# Patient Record
Sex: FEMALE | Race: WHITE | NOT HISPANIC OR LATINO | Employment: FULL TIME | URBAN - METROPOLITAN AREA
[De-identification: names, ages, dates, MRNs, and addresses within clinical notes are randomized per-mention and may not be internally consistent; named-entity substitution may affect disease eponyms.]

---

## 2022-08-11 DIAGNOSIS — O09.522 MULTIGRAVIDA OF ADVANCED MATERNAL AGE IN SECOND TRIMESTER: Primary | ICD-10-CM

## 2022-08-22 ENCOUNTER — OFFICE VISIT (OUTPATIENT)
Dept: MATERNAL FETAL MEDICINE | Facility: CLINIC | Age: 37
End: 2022-08-22
Payer: MEDICAID

## 2022-08-22 VITALS
HEIGHT: 64 IN | WEIGHT: 182 LBS | HEART RATE: 88 BPM | SYSTOLIC BLOOD PRESSURE: 124 MMHG | RESPIRATION RATE: 20 BRPM | DIASTOLIC BLOOD PRESSURE: 62 MMHG | OXYGEN SATURATION: 98 % | BODY MASS INDEX: 31.07 KG/M2

## 2022-08-22 DIAGNOSIS — O09.522 AMA (ADVANCED MATERNAL AGE) MULTIGRAVIDA 35+, SECOND TRIMESTER: Chronic | ICD-10-CM

## 2022-08-22 DIAGNOSIS — O09.522 MULTIGRAVIDA OF ADVANCED MATERNAL AGE IN SECOND TRIMESTER: ICD-10-CM

## 2022-08-22 PROCEDURE — 76811 OB US DETAILED SNGL FETUS: CPT | Mod: 26,95,S$GLB, | Performed by: OBSTETRICS & GYNECOLOGY

## 2022-08-22 PROCEDURE — 76811 PR US, OB FETAL EVAL & EXAM, TRANSABDOM,FIRST GESTATION: ICD-10-PCS | Mod: 26,95,S$GLB, | Performed by: OBSTETRICS & GYNECOLOGY

## 2022-08-22 PROCEDURE — 1159F PR MEDICATION LIST DOCUMENTED IN MEDICAL RECORD: ICD-10-PCS | Mod: CPTII,S$GLB,, | Performed by: OBSTETRICS & GYNECOLOGY

## 2022-08-22 PROCEDURE — 3074F PR MOST RECENT SYSTOLIC BLOOD PRESSURE < 130 MM HG: ICD-10-PCS | Mod: CPTII,S$GLB,, | Performed by: OBSTETRICS & GYNECOLOGY

## 2022-08-22 PROCEDURE — 3008F PR BODY MASS INDEX (BMI) DOCUMENTED: ICD-10-PCS | Mod: CPTII,S$GLB,, | Performed by: OBSTETRICS & GYNECOLOGY

## 2022-08-22 PROCEDURE — 99203 OFFICE O/P NEW LOW 30 MIN: CPT | Mod: 25,TH,95,S$GLB | Performed by: OBSTETRICS & GYNECOLOGY

## 2022-08-22 PROCEDURE — 1159F MED LIST DOCD IN RCRD: CPT | Mod: CPTII,S$GLB,, | Performed by: OBSTETRICS & GYNECOLOGY

## 2022-08-22 PROCEDURE — 3008F BODY MASS INDEX DOCD: CPT | Mod: CPTII,S$GLB,, | Performed by: OBSTETRICS & GYNECOLOGY

## 2022-08-22 PROCEDURE — 3078F DIAST BP <80 MM HG: CPT | Mod: CPTII,S$GLB,, | Performed by: OBSTETRICS & GYNECOLOGY

## 2022-08-22 PROCEDURE — 3074F SYST BP LT 130 MM HG: CPT | Mod: CPTII,S$GLB,, | Performed by: OBSTETRICS & GYNECOLOGY

## 2022-08-22 PROCEDURE — 99203 PR OFFICE/OUTPT VISIT, NEW, LEVL III, 30-44 MIN: ICD-10-PCS | Mod: 25,TH,95,S$GLB | Performed by: OBSTETRICS & GYNECOLOGY

## 2022-08-22 PROCEDURE — 3078F PR MOST RECENT DIASTOLIC BLOOD PRESSURE < 80 MM HG: ICD-10-PCS | Mod: CPTII,S$GLB,, | Performed by: OBSTETRICS & GYNECOLOGY

## 2022-08-22 RX ORDER — SYRINGE WITH NEEDLE, INSULIN
1 SYRINGE, EMPTY DISPOSABLE MISCELLANEOUS DAILY
COMMUNITY
Start: 2022-08-15

## 2022-08-22 RX ORDER — ASPIRIN 81 MG/1
81 TABLET ORAL DAILY
COMMUNITY
Start: 2022-07-16

## 2022-08-22 RX ORDER — FOLIC ACID 1 MG/1
1000 TABLET ORAL DAILY
COMMUNITY
Start: 2022-08-15

## 2022-08-22 NOTE — PROGRESS NOTES
"Indication for consultation:  1. Intrauterine pregnancy at 24w5d  2. AMA    Provider requesting consultation: Akilah Lundebrg MD    Dear Akilah,    Thank you very much for your referral of Gabriela Mak who was seen for initial perinatology consultation and sonography today.  As you recall she is a 36 y.o.  at 24w5d.  She has undergone both NIPT and MSAFP both of which returned negative.  She has no complaints today and reports good fetal movement and denies any vaginal bleeding, leakage of fluid, or cramping.  She does take aspirin 81 mg daily.      PMH:  Postpartum depression x 2    Ob Hx:  In  she had a vaginal delivery at 39 weeks for healthy 6 lb female infant, then  she had a 10 week loss, then in  she had a  at 39 weeks for 6 lb 14 oz healthy infant due to breech presentation    PSH:   x1    Family hx:  Diabetes, hypertension    SOC:  Patient does have a history of drug and alcohol use.  She quit tobacco and drugs in  and reports no alcohol use since 2018    Medications:  Aspirin 81 mg, folate    Allergies:  No known drug allergies    Review of systems: The patient denies any vaginal bleeding, loss of fluid or contraction pain today.  Vitals:    22 1150   BP: 124/62   Pulse: 88   Resp: 20   SpO2: 98%   Weight: 82.6 kg (182 lb)   Height: 5' 4" (1.626 m)     Body mass index is 31.24 kg/m².    Physical exam:  Deferred as this was a telemedicine visit.    Ultrasound:  We demonstrated a West gestation in cephalic presentation.  Fetal heart rate 138 beats per minute.  The placenta is anterior and grade 1. Umbilical cord is 3 vessel.  Amniotic fluid volume maximum vertical pocket 5.8 cm.  BPD is 24 weeks and 1 day, head circumference 24 weeks and 6 days, abdominal circumference 25 weeks and 1 day, and femur length 25 weeks and 4 days.  This corresponds to 780 g or 1 lb 12 oz or 25 weeks and 0 days which is at the 52nd percentile.  We visualized normal intracranial " contents including lateral ventricles, cerebellum, choroid plexus, midline falx, cavum septum pellucidum, cisterna magna.  The fetal upper lips and nose appears normal but profile is limited.  We visualized four-chamber view of the heart and left and right outflow tracts.  We saw normal stomach, kidneys, bladder, three-vessel cord.  Spine appears to be intact and extremities are present.    Counseling:  I discussed with your patient her age-related risk of having a baby with Down syndrome or some other type of major chromosome problem.  With our reassuring ultrasound today and a normal NIPT the risk of the most common chromosome conditions, such as Down syndrome, is less than 1 in 10,000 chance.  I do not feel strongly that additional aneuploidy testing is indicated.  She is a healthy individual with no contraindications or concerns about having another healthy pregnancy.  I told her she may consider starting antidepressant medication in the early postpartum state.    Assessment:  1. Intrauterine pregnancy at 24w5d  2. AMA with negative NIPT and MSAFP    Recommendations:   1. Overall findings were reassuring today.  Her chromosome risk is very low with a normal NIPT and reassuring findings today.  We cannot visualize all of the fetal face/profile or heart today so we will bring her back for 1 final visit in 4-6 weeks.  2. Given that she had postpartum depression with both of her other pregnancy she has significant risk of it occurring again.  I would be sure to plan on a 1 week follow-up postpartum visit with you and consider early initiation of antidepressant medications    Thanks once again for allowing us to participate in the care of your patients.  If you have any questions about today's consultation feel free to contact me or my partners at [phone].    Sincerely,    Daniel Rothman Jr., M.D.  Maternal-Fetal Medicine       Today's visit was a face-to-face telemedicine video visit via [email or web address] secure link  between [facility] office in [place], [place] and [facility] in [place], [place].    Total time personally involved in this patient's care: 30 minutes

## 2022-08-22 NOTE — PROGRESS NOTES
"Gabriela is here for initial MFM consultation, referred by Dr. Lundberg for AMA with low risk NIPT and negative MSAFP.    She is feeling fetal movement.    Gabriela denies vaginal bleeding, loss of fluid, recurrent contractions.    She is taking ASA 81 mg PO daily.      Vitals:    08/22/22 1150   BP: 124/62   Pulse: 88   Resp: 20   SpO2: 98%   Weight: 82.6 kg (182 lb)   Height: 5' 4" (1.626 m)      BMI:                    31.24 kg/m^2             "

## 2022-08-22 NOTE — LETTER
August 22, 2022        Akilah Lundberg MD  [address]  [facility] [address]             [facility]  [address]  [place] [address]  Phone: [phone]  Fax: [phone]   Patient: Gabriela Mak   MR Number: [id number]   YOB: 1985   Date of Visit: 8/22/2022       Dear Dr. Lundberg:    Thank you for referring Gabriela Mak to me for evaluation. Attached you will find relevant portions of my assessment and plan of care.    If you have questions, please do not hesitate to call me. I look forward to following Gabriela Mak along with you.    Sincerely,      Daniel Rothman Jr., MD            Daniel Rothman Jr., MD    [facility]

## 2022-09-19 DIAGNOSIS — O09.523 MULTIGRAVIDA OF ADVANCED MATERNAL AGE IN THIRD TRIMESTER: Primary | ICD-10-CM

## 2022-09-29 ENCOUNTER — PROCEDURE VISIT (OUTPATIENT)
Dept: MATERNAL FETAL MEDICINE | Facility: CLINIC | Age: 37
End: 2022-09-29
Payer: MEDICAID

## 2022-09-29 VITALS
OXYGEN SATURATION: 99 % | RESPIRATION RATE: 18 BRPM | DIASTOLIC BLOOD PRESSURE: 62 MMHG | BODY MASS INDEX: 31.76 KG/M2 | SYSTOLIC BLOOD PRESSURE: 110 MMHG | HEART RATE: 108 BPM | WEIGHT: 185 LBS

## 2022-09-29 DIAGNOSIS — O09.523 MULTIGRAVIDA OF ADVANCED MATERNAL AGE IN THIRD TRIMESTER: ICD-10-CM

## 2022-09-29 PROCEDURE — 99213 OFFICE O/P EST LOW 20 MIN: CPT | Mod: TH,S$GLB,, | Performed by: OBSTETRICS & GYNECOLOGY

## 2022-09-29 PROCEDURE — 99213 PR OFFICE/OUTPT VISIT, EST, LEVL III, 20-29 MIN: ICD-10-PCS | Mod: TH,S$GLB,, | Performed by: OBSTETRICS & GYNECOLOGY

## 2022-09-29 PROCEDURE — 76816 OB US FOLLOW-UP PER FETUS: CPT | Mod: S$GLB,,, | Performed by: OBSTETRICS & GYNECOLOGY

## 2022-09-29 PROCEDURE — 76816 PR  US,PREGNANT UTERUS,F/U,TRANSABD APP: ICD-10-PCS | Mod: S$GLB,,, | Performed by: OBSTETRICS & GYNECOLOGY

## 2022-09-29 RX ORDER — KETOCONAZOLE 20 MG/G
CREAM TOPICAL
COMMUNITY
Start: 2022-09-28 | End: 2023-09-28

## 2022-09-29 RX ORDER — KETOCONAZOLE 20 MG/ML
SHAMPOO, SUSPENSION TOPICAL
COMMUNITY
Start: 2022-09-28 | End: 2022-10-28

## 2022-09-29 RX ORDER — CLINDAMYCIN PHOSPHATE 11.9 MG/ML
SOLUTION TOPICAL 2 TIMES DAILY
COMMUNITY
Start: 2022-09-28 | End: 2023-09-28

## 2022-09-29 RX ORDER — AZELAIC ACID 0.2 G/G
CREAM CUTANEOUS 2 TIMES DAILY
COMMUNITY
Start: 2022-09-28 | End: 2023-09-28

## 2022-09-29 NOTE — PROGRESS NOTES
Gabriela is here for followup [facility] consultation for AMA with low risk NIPT, referred by Dr. Lundberg.    She is feeling fetal movement.    Gabriela denies vaginal bleeding, loss of fluid, recurrent contractions; her only complaint is her feet swell in the evenings after working all day.    She is taking ASA 81 mg PO daily, and several topical dematalogic creams and shampoo.    Vitals:    09/29/22 1133   BP: 110/62   Pulse: 108   Resp: 18   SpO2: 99%   Weight: 83.9 kg (185 lb)     BMI:                    31.76 kg/m^2

## 2022-09-29 NOTE — LETTER
September 29, 2022        Akilah Lundberg MD  [address]  [facility] [address]             [facility]  [address]  [place] [address]  Phone: [phone]  Fax: [phone]   Patient: Gabriela Mak   MR Number: [id number]   YOB: 1985   Date of Visit: 9/29/2022       Dear Dr. Lundberg:    Thank you for referring Gabriela Mak to me for evaluation. Below are the relevant portions of my assessment and plan of care.            If you have questions, please do not hesitate to call me. I look forward to following Gabriela along with you.    Sincerely,      Rajni Dunne MD          CC  Rajni Dunne MD

## 2022-09-29 NOTE — PROGRESS NOTES
Follow-up MFM Consultation  Referring provider: Dr. Lundberg    Indications for referral:  1) Pregnancy at 30 weeks and 2 days  (EDC 12-6-22)  2) AMA with low risk NIPT    Dear Dr. Lundberg,  Thank you for your kind request for consultation and imaging of your patient at the [facility].  There have been no changes in her history since her last visit here. She has no complaints today.  She returns for completion of the anatomic survey.     Physical Exam  Vital signs: 110/62, 108, 78  General: Age appearing female in no apparent distress.  ABDOMEN:  Gravid, soft, nontender  Uterus: Nontender, appropriate height for gestational age    ULTRASOUND FINDINGS:  A repeat ultrasound was performed. The fetus is cephalic. EFW of 1862 grams is at the 57th percentile.  The placenta is anterior.  Amniotic fluid is normal. There are no fetal structural malformations to extent of view.  Structures not previously visualized were seen today and appear normal.    IMPRESSION:     1) 30 week gestation  2) Low risk for aneuploidy  3) Reassuring fetal growth and anatomy    RECOMMENDATIONS/DISCUSSION:  1) We discussed the reassuring sonographic findings.  No further MFM follow-up is indicated for this reason.    Thank you for allowing us to participate in her care.  Please do not hesitate to call with questions.   -Rajni Dunne MD